# Patient Record
Sex: FEMALE | Race: WHITE | NOT HISPANIC OR LATINO | Employment: OTHER | URBAN - METROPOLITAN AREA
[De-identification: names, ages, dates, MRNs, and addresses within clinical notes are randomized per-mention and may not be internally consistent; named-entity substitution may affect disease eponyms.]

---

## 2023-01-20 ENCOUNTER — APPOINTMENT (OUTPATIENT)
Dept: RADIOLOGY | Facility: MEDICAL CENTER | Age: 88
End: 2023-01-20
Attending: EMERGENCY MEDICINE
Payer: MEDICARE

## 2023-01-20 ENCOUNTER — HOSPITAL ENCOUNTER (EMERGENCY)
Facility: MEDICAL CENTER | Age: 88
End: 2023-01-20
Attending: EMERGENCY MEDICINE
Payer: MEDICARE

## 2023-01-20 VITALS
RESPIRATION RATE: 15 BRPM | TEMPERATURE: 97.3 F | SYSTOLIC BLOOD PRESSURE: 141 MMHG | OXYGEN SATURATION: 94 % | HEART RATE: 62 BPM | WEIGHT: 130 LBS | DIASTOLIC BLOOD PRESSURE: 74 MMHG | BODY MASS INDEX: 20.4 KG/M2 | HEIGHT: 67 IN
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
## 2023-01-20 DIAGNOSIS — S00.01XA ABRASION OF SCALP, INITIAL ENCOUNTER: ICD-10-CM

## 2023-01-20 DIAGNOSIS — S09.90XA CLOSED HEAD INJURY, INITIAL ENCOUNTER: ICD-10-CM

## 2023-01-20 PROCEDURE — 99284 EMERGENCY DEPT VISIT MOD MDM: CPT

## 2023-01-20 PROCEDURE — 72125 CT NECK SPINE W/O DYE: CPT

## 2023-01-20 PROCEDURE — 304217 HCHG IRRIGATION SYSTEM

## 2023-01-20 PROCEDURE — 700102 HCHG RX REV CODE 250 W/ 637 OVERRIDE(OP): Performed by: EMERGENCY MEDICINE

## 2023-01-20 PROCEDURE — 70450 CT HEAD/BRAIN W/O DYE: CPT

## 2023-01-20 PROCEDURE — A9270 NON-COVERED ITEM OR SERVICE: HCPCS | Performed by: EMERGENCY MEDICINE

## 2023-01-20 RX ORDER — ACETAMINOPHEN 325 MG/1
650 TABLET ORAL ONCE
Status: COMPLETED | OUTPATIENT
Start: 2023-01-20 | End: 2023-01-20

## 2023-01-20 RX ADMIN — ACETAMINOPHEN 650 MG: 325 TABLET ORAL at 23:21

## 2023-01-21 NOTE — ED TRIAGE NOTES
Chief Complaint   Patient presents with    T-5000 Head Injury     TBI, fell getting on escalator hitting back of head, no LOC but does not remember falling, laceration to L posterior head, bleeding controlled, some neck soreness with movement, c-collar in place

## 2023-01-21 NOTE — DISCHARGE INSTRUCTIONS
Your CT scan shows no dangerous injury, broken bones or bleeding in the brain.  Please keep the dressing on until tomorrow.  At that point you can shower but please avoid submerging your head such as in swimming pools hot tubs or similar.  Clean the wound gently with soap and water.  You may still have a small amount of oozing over the next few days

## 2023-01-21 NOTE — ED NOTES
Pt discharged in stable condition and assisted to  for cofort to lobby. Pt IV removed and daughter aware how to monitor pt throughout the night and when to return.

## 2023-01-21 NOTE — ED PROVIDER NOTES
ED Provider Note    CHIEF COMPLAINT  Chief Complaint   Patient presents with    T-5000 Head Injury     TBI, fell getting on escalator hitting back of head, no LOC but does not remember falling, laceration to L posterior head, bleeding controlled, some neck soreness with movement, c-collar in place       EXTERNAL RECORDS REVIEWED  Other none available    HPI/ROS  LIMITATION TO HISTORY   Select: : None  OUTSIDE HISTORIAN(S):  EMS      Trini Desai is a 88 y.o. female who presents after a fall.  Patient reports that she was at a family event, and had a glass of wine, she was going up the escalator when she slipped and fell back hitting her head.  Positive LOC.  She is reporting some headache as well as neck pain.  She reports no nausea or vomiting.  No focal weakness numbness or tingling.  She reports no chest pain or shortness of breath or abdominal pain.  No other focal areas of pain.  She has been in her normal state of health recently    EMS reports she has been acting appropriately throughout transport.  No seizure activity or vomiting    She takes 81 mg aspirin daily, no other blood thinners    PAST MEDICAL HISTORY       SURGICAL HISTORY  patient denies any surgical history    FAMILY HISTORY  History reviewed. No pertinent family history.    SOCIAL HISTORY  Social History     Tobacco Use    Smoking status: Never    Smokeless tobacco: Never   Vaping Use    Vaping Use: Never used   Substance and Sexual Activity    Alcohol use: Yes     Comment: social events, wine    Drug use: Never    Sexual activity: Not on file       CURRENT MEDICATIONS  Home Medications       Reviewed by Chanel Batista R.N. (Registered Nurse) on 01/20/23 at 2202  Med List Status: Not Addressed     Medication Last Dose Status        Patient Celso Taking any Medications                           ALLERGIES  Allergies   Allergen Reactions    Aleve [ ] Nausea    Codeine Nausea    Demerol Nausea       PHYSICAL EXAM  VITAL SIGNS: BP (!) 142/72    "Pulse 62   Temp 36.3 °C (97.3 °F) (Temporal)   Ht 1.702 m (5' 7\")   Wt 59 kg (130 lb)   SpO2 95%   BMI 20.36 kg/m²      Pulse ox interpretation: I interpret this pulse ox as normal.  Constitutional: Alert in no apparent distress.  HENT: There is a contusion and abrasion to the posterior occiput, no bogginess, no Concepcion's, no raccoons bilateral external ears normal, Nose normal.   Eyes: Pupils are equal and reactive, Conjunctiva normal, Non-icteric.   Neck: Tenderness to C5 without any deformity or step-off, c-collar is in place no stridor.   Cardiovascular: Regular rate and rhythm, no murmurs.   Thorax & Lungs: Normal breath sounds, No respiratory distress, No wheezing, No chest tenderness.   Abdomen: Bowel sounds normal, Soft, No tenderness, No masses, No pulsatile masses. No peritoneal signs.  Skin: Warm, Dry, No erythema, No rash.   Back: No bony tenderness, No CVA tenderness.   Extremities: Intact distal pulses, No edema, No tenderness, No cyanosis,  Negative Liberty's sign.   Musculoskeletal: Good range of motion in all major joints. No tenderness to palpation or major deformities noted.   Neurologic: Alert , equal  strength bilaterally, no drift, no drift of lower extremities, sensation is intact to light touch throughout normal motor function, Normal sensory function, No focal deficits noted.   Psychiatric: Affect normal, Judgment normal, Mood normal.               DIAGNOSTIC STUDIES / PROCEDURES    RADIOLOGY  I have independently interpreted the diagnostic imaging associated with this visit and am waiting the final reading from the radiologist.   My preliminary interpretation is a follows: no bleed    CT-CSPINE WITHOUT PLUS RECONS   Final Result         1.  Multilevel degenerative changes of the cervical spine limit diagnostic sensitivity of this examination, otherwise no acute traumatic bony injury of the cervical spine is apparent.   2.  Atherosclerosis      CT-HEAD W/O   Final Result         1.  No " acute intracranial abnormality is identified, there are nonspecific white matter changes, commonly associated with small vessel ischemic disease.  Associated mild cerebral atrophy is noted.   2.  Atherosclerosis.               COURSE & MEDICAL DECISION MAKING    INITIAL ASSESSMENT AND PLAN  Care Narrative: 9:54 PM  Patient is evaluated at the charge desk per TBI protocol.  At this point differential includes but is not limited to, skull fracture, subarachnoid bleed, subdural bleed, epidural bleed, cervical spine fracture.  Given this advanced imaging with CT is ordered of her head and C-spine.     10:45 PM  Patient is reevaluated, updated on results, c-collar removed, will plan for cleaning of her wound    11:15 PM  After evaluation of the wound, this appears superficial and not amenable to sutures or staples.  We will plan for wound dressing and discharge    ADDITIONAL PROBLEM LIST AND DISPOSITION  #1 closed head injury.  Patient did fall on the escalator and given this and her age, CT was obtained that shows no evidence of subarachnoid, subdural, epidural, skull fracture and no findings of cervical spine fracture either.  She is comfortable with Tylenol at home to help with her pain.  She is neurologically intact.  Discussed return precautions including delayed bleed precautions    #2 abrasion.  Patient has an abrasion to the posterior scalp, this is linear and superficial bleeding is controlled and there is no indication for sutures or staples at this time.      Escalation of care considered, and ultimately not performed:blood analysis per patient is not on any blood thinners to indicate this    Barriers to care at this time, including but not limited to:  none .     Decision tools and prescription drugs considered including, but not limited to: Pain Medications comfortable with Tylenol .        The patient will return for new or worsening symptoms and is stable at the time of discharge.    The patient is  referred to a primary physician for blood pressure management, diabetic screening, and for all other preventative health concerns.      DISPOSITION:  Patient will be discharged home in stable condition.    FOLLOW UP:  [facility], Emergency Dept  [address] [address] [phone]    As needed      OUTPATIENT MEDICATIONS:  New Prescriptions    No medications on file       FINAL DIAGNOSIS  1. Closed head injury, initial encounter    2. Abrasion of scalp, initial encounter    3. Fall, initial encounter               Electronically signed by: Juliano Garcia M.D., 1/20/2023 9:52 PM

## 2023-01-21 NOTE — ED NOTES
"This RN agree with triage. Pt fell on escalator, laceration to left posterior head. Pt states \"not experiencing a lot of pain at the moment\" c-collar in place. Family at bedside.   Pt AOx4, VSS, expressing no acute distress. All needs met at this time. Will cotinue to monitor  "